# Patient Record
Sex: MALE | URBAN - METROPOLITAN AREA
[De-identification: names, ages, dates, MRNs, and addresses within clinical notes are randomized per-mention and may not be internally consistent; named-entity substitution may affect disease eponyms.]

---

## 2017-11-30 ENCOUNTER — OFFICE VISIT (OUTPATIENT)
Dept: FAMILY MEDICINE CLINIC | Age: 22
End: 2017-11-30

## 2017-11-30 VITALS
OXYGEN SATURATION: 95 % | RESPIRATION RATE: 14 BRPM | BODY MASS INDEX: 26.66 KG/M2 | HEIGHT: 69 IN | TEMPERATURE: 98.7 F | HEART RATE: 88 BPM | WEIGHT: 180 LBS | SYSTOLIC BLOOD PRESSURE: 136 MMHG | DIASTOLIC BLOOD PRESSURE: 74 MMHG

## 2017-11-30 DIAGNOSIS — J01.00 ACUTE NON-RECURRENT MAXILLARY SINUSITIS: ICD-10-CM

## 2017-11-30 DIAGNOSIS — J02.9 SORE THROAT: ICD-10-CM

## 2017-11-30 DIAGNOSIS — J02.0 STREP THROAT: Primary | ICD-10-CM

## 2017-11-30 DIAGNOSIS — M75.21 BICIPITAL TENDINITIS OF RIGHT SHOULDER: ICD-10-CM

## 2017-11-30 LAB
S PYO AG THROAT QL: POSITIVE
VALID INTERNAL CONTROL?: YES

## 2017-11-30 RX ORDER — HYDROCODONE POLISTIREX AND CHLORPHENIRAMINE POLISTIREX 10; 8 MG/5ML; MG/5ML
1 SUSPENSION, EXTENDED RELEASE ORAL
Qty: 90 ML | Refills: 0 | Status: SHIPPED | OUTPATIENT
Start: 2017-11-30 | End: 2018-05-03 | Stop reason: ALTCHOICE

## 2017-11-30 RX ORDER — AMOXICILLIN 875 MG/1
875 TABLET, FILM COATED ORAL 2 TIMES DAILY
Qty: 20 TAB | Refills: 0 | Status: SHIPPED | OUTPATIENT
Start: 2017-11-30 | End: 2017-12-10

## 2017-11-30 NOTE — LETTER
NOTIFICATION RETURN TO WORK / SCHOOL 
 
11/30/2017 3:14 PM 
 
Mr. Noa Han [address] [facility]. [facility] [phone] To Whom It May Concern: 
 
Noa Han is currently under the care of [facility]. He will return to work/school on: 12/04/17 If there are questions or concerns please have the patient contact our office. Sincerely, Omari Espitia MD

## 2017-11-30 NOTE — PATIENT INSTRUCTIONS
Take the antibiotic for 10 days and use the cough medication twice daily as needed. Rest your R shoulder for a few days, particularly doing curls.     Recheck as needed

## 2017-11-30 NOTE — PROGRESS NOTES
HISTORY OF PRESENT ILLNESS  Adelfo Bustillos is a 25 y.o. male. HPI Comments: Adelfo Bustillos is a 24 y/o male who presents today with a sore throat. He states that his symptoms began about a week ago. He has been experiencing a sore throat, cough, and headaches. He has tried throat lozenges but states that they do not help enough. He does not know if he has been around any sick contacts. He denies any fevers. He states that he has had R shoulder pain for a couple days. He states that it is painful to sleep on and to abduct above his head. He denies any trauma but does work out at [employer]. Sore Throat    Associated symptoms include cough. Pertinent negatives include no vomiting, no headaches and no shortness of breath. Cough   Pertinent negatives include no chest pain, no abdominal pain, no headaches and no shortness of breath. Review of Systems   Constitutional: Negative for chills and fever. HENT: Positive for sore throat. Eyes: Negative for blurred vision and double vision. Respiratory: Positive for cough. Negative for shortness of breath. Cardiovascular: Negative for chest pain and palpitations. Gastrointestinal: Negative for abdominal pain, nausea and vomiting. Musculoskeletal: Positive for joint pain (shoulder). Neurological: Negative for headaches. Physical Exam   Constitutional: He is oriented to person, place, and time. He appears well-developed and well-nourished. HENT:   Nose: Right sinus exhibits maxillary sinus tenderness. Left sinus exhibits maxillary sinus tenderness. Throat not red   Eyes: Pupils are equal, round, and reactive to light. Neck: Neck supple. No thyromegaly present. Cardiovascular: Normal rate and regular rhythm. Pulmonary/Chest: Effort normal and breath sounds normal. No respiratory distress. He has no wheezes. He has no rales. Abdominal: Soft. There is no tenderness.    Musculoskeletal:   Slightly tender anterior R shoulder Lymphadenopathy:     He has no cervical adenopathy. Neurological: He is alert and oriented to person, place, and time. Nursing note and vitals reviewed. ASSESSMENT and PLAN    ICD-10-CM ICD-9-CM    1. Strep throat J02.0 034.0 amoxicillin (AMOXIL) 875 mg tablet      chlorpheniramine-HYDROcodone (TUSSIONEX) 10-8 mg/5 mL suspension   2. Sore throat J02.9 462 AMB POC RAPID STREP A   3. Acute non-recurrent maxillary sinusitis J01.00 461.0 amoxicillin (AMOXIL) 875 mg tablet   4.  Bicipital tendinitis of right shoulder M75.21 726.12        AVS instructions reviewed with patient, pt verbalized understanding

## 2017-11-30 NOTE — MR AVS SNAPSHOT
Visit Information Date & Time Provider Department Dept. Phone Encounter #  
 11/30/2017  2:30 PM Gilles AjReji [facility] [phone] [id number] Follow-up Instructions Return if symptoms worsen or fail to improve. Upcoming Health Maintenance Date Due  
 HPV AGE 9Y-34Y (1 of 3 - Male 3 Dose Series) 9/7/2007 Influenza Age 5 to Adult 8/1/2017 DTaP/Tdap/Td series (1 - Tdap) 9/7/2017 Allergies as of 11/30/2017  Review Complete On: 11/30/2017 By: Gilles Aj MD  
 No Known Allergies Current Immunizations  Never Reviewed Name Date Influenza Vaccine 10/15/2017 Not reviewed this visit You Were Diagnosed With   
  
 Codes Comments Strep throat    -  Primary ICD-10-CM: J02.0 ICD-9-CM: 034.0 Sore throat     ICD-10-CM: J02.9 ICD-9-CM: 476 Acute non-recurrent maxillary sinusitis     ICD-10-CM: J01.00 ICD-9-CM: 461.0 Bicipital tendinitis of right shoulder     ICD-10-CM: M75.21 ICD-9-CM: 726.12 Vitals BP Pulse Temp Resp Height(growth percentile) Weight(growth percentile) 136/74 (BP 1 Location: Right arm, BP Patient Position: Sitting) 88 98.7 °F (37.1 °C) (Oral) 14 5' 9\" (1.753 m) 180 lb (81.6 kg) SpO2 BMI Smoking Status 95% 26.58 kg/m2 Never Smoker BMI and BSA Data Body Mass Index Body Surface Area  
 26.58 kg/m 2 1.99 m 2 Preferred Pharmacy Pharmacy Name Phone [id number], [address] [place] [address] Ricardo Janett [phone] Your Updated Medication List  
  
   
This list is accurate as of: 11/30/17  3:11 PM.  Always use your most recent med list.  
  
  
  
  
 amoxicillin 875 mg tablet Commonly known as:  AMOXIL Take 1 Tab by mouth two (2) times a day for 10 days. chlorpheniramine-HYDROcodone 10-8 mg/5 mL suspension Commonly known as:  Leonetta Krishnan Take 5 mL by mouth every twelve (12) hours as needed for Cough.  Max Daily Amount: 10 mL. Prescriptions Printed Refills  
 chlorpheniramine-HYDROcodone (TUSSIONEX) 10-8 mg/5 mL suspension 0 Sig: Take 5 mL by mouth every twelve (12) hours as needed for Cough. Max Daily Amount: 10 mL. Class: Print Route: Oral  
  
Prescriptions Sent to Pharmacy Refills  
 amoxicillin (AMOXIL) 875 mg tablet 0 Sig: Take 1 Tab by mouth two (2) times a day for 10 days. Class: Normal  
 Pharmacy: [address] [place] [address], [address] [place] [address] #: [phone] Route: Oral  
  
We Performed the Following AMB POC RAPID STREP A [30950 CPT(R)] Follow-up Instructions Return if symptoms worsen or fail to improve. Patient Instructions Take the antibiotic for 10 days and use the cough medication twice daily as needed. Rest your R shoulder for a few days, particularly doing curls. Recheck as needed Introducing [address]! [employer] introduces [employer] patient portal. Now you can access parts of your medical record, email your doctor's office, and request medication refills online. 1. In your internet browser, go to [email or web address] 2. Click on the First Time User? Click Here link in the Sign In box. You will see the New Member Sign Up page. 3. Enter your [employer] Access Code exactly as it appears below. You will not need to use this code after youve completed the sign-up process. If you do not sign up before the expiration date, you must request a new code. · [employer] Access Code: [email or web address] Expires: 2/28/2018  3:11 PM 
 
4. Enter the last four digits of your Social Security Number (xxxx) and Date of Birth (mm/dd/yyyy) as indicated and click Submit. You will be taken to the next sign-up page. 5. Create a [employer] ID. This will be your [employer] login ID and cannot be changed, so think of one that is secure and easy to remember. 6. Create a [employer] password. You can change your password at any time. 7. Enter your Password Reset Question and Answer. This can be used at a later time if you forget your password. 8. Enter your e-mail address. You will receive e-mail notification when new information is available in [address] 9. Click Sign Up. You can now view and download portions of your medical record. 10. Click the Download Summary menu link to download a portable copy of your medical information. If you have questions, please visit the Frequently Asked Questions section of the [employer] website. Remember, [employer] is NOT to be used for urgent needs. For medical emergencies, dial 911. Now available from your iPhone and Android! Please provide this summary of care documentation to your next provider. If you have any questions after today's visit, please call [phone].

## 2017-11-30 NOTE — PROGRESS NOTES
Patient presents to the clinic for a sore throat that began 1 week ago. Patient complains of a productive cough, nasal congestion. Patient would also like to discuss right shoulder pain. Flu Shot Requested: received    Depression Screening:  PHQ over the last two weeks 11/30/2017   Little interest or pleasure in doing things Not at all   Feeling down, depressed or hopeless Not at all   Total Score PHQ 2 0       Learning Assessment:  Learning Assessment 11/30/2017   PRIMARY LEARNER Patient   HIGHEST LEVEL OF EDUCATION - PRIMARY LEARNER  2 YEARS OF COLLEGE   BARRIERS PRIMARY LEARNER NONE   CO-LEARNER CAREGIVER No   PRIMARY LANGUAGE ENGLISH   LEARNER PREFERENCE PRIMARY DEMONSTRATION   ANSWERED BY patient   RELATIONSHIP SELF       Abuse Screening:  No flowsheet data found. Health Maintenance reviewed and discussed per provider: yes     Coordination of Care:    1. Have you been to the ER, urgent care clinic since your last visit? Hospitalized since your last visit? no    2. Have you seen or consulted any other health care providers outside of the [address] [place] [address] since your last visit? Include any pap smears or colon screening.  no

## 2018-05-03 ENCOUNTER — OFFICE VISIT (OUTPATIENT)
Dept: FAMILY MEDICINE CLINIC | Age: 23
End: 2018-05-03

## 2018-05-03 VITALS
TEMPERATURE: 98.4 F | OXYGEN SATURATION: 97 % | BODY MASS INDEX: 26.66 KG/M2 | HEART RATE: 101 BPM | RESPIRATION RATE: 18 BRPM | DIASTOLIC BLOOD PRESSURE: 76 MMHG | HEIGHT: 69 IN | WEIGHT: 180 LBS | SYSTOLIC BLOOD PRESSURE: 133 MMHG

## 2018-05-03 DIAGNOSIS — H66.93 BILATERAL OTITIS MEDIA, UNSPECIFIED OTITIS MEDIA TYPE: Primary | ICD-10-CM

## 2018-05-03 DIAGNOSIS — R11.2 NAUSEA AND VOMITING, INTRACTABILITY OF VOMITING NOT SPECIFIED, UNSPECIFIED VOMITING TYPE: ICD-10-CM

## 2018-05-03 DIAGNOSIS — K52.9 GASTROENTERITIS: ICD-10-CM

## 2018-05-03 RX ORDER — AMOXICILLIN 500 MG/1
500 CAPSULE ORAL 2 TIMES DAILY
Qty: 20 CAP | Refills: 0 | Status: SHIPPED | OUTPATIENT
Start: 2018-05-03 | End: 2018-05-13

## 2018-05-03 RX ORDER — ONDANSETRON 4 MG/1
4 TABLET, ORALLY DISINTEGRATING ORAL
Qty: 20 TAB | Refills: 0 | Status: SHIPPED | OUTPATIENT
Start: 2018-05-03

## 2018-05-03 NOTE — MR AVS SNAPSHOT
[address] [address] 
[phone] Patient: Meghann Chanel MRN: [id number] CYA:[birth year] Visit Information Date & Time Provider Department Dept. Phone Encounter #  
 5/3/2018  3:00 PM Bailee Iyer NP [email or web address] [phone] [id number] Follow-up Instructions Return if symptoms worsen or fail to improve, for panic attack. Upcoming Health Maintenance Date Due  
 HPV Age 9Y-34Y (3 of 1 - Male 3 Dose Series) 9/7/2007 DTaP/Tdap/Td series (1 - Tdap) 9/7/2017 Influenza Age 5 to Adult 8/1/2018 Allergies as of 5/3/2018  Review Complete On: 5/3/2018 By: Jairon Shah LPN No Known Allergies Current Immunizations  Never Reviewed Name Date Influenza Vaccine 10/15/2017 Not reviewed this visit You Were Diagnosed With   
  
 Codes Comments Bilateral otitis media, unspecified otitis media type    -  Primary ICD-10-CM: H66.93 
ICD-9-CM: 382.9 Gastroenteritis     ICD-10-CM: K52.9 ICD-9-CM: 558.9 Nausea and vomiting, intractability of vomiting not specified, unspecified vomiting type     ICD-10-CM: R11.2 ICD-9-CM: 787.01 Vitals BP Pulse Temp Resp Height(growth percentile) Weight(growth percentile) 133/76 (BP 1 Location: Right arm, BP Patient Position: Sitting) (!) 101 98.4 °F (36.9 °C) (Oral) 18 5' 9\" (1.753 m) 180 lb (81.6 kg) SpO2 BMI Smoking Status 97% 26.58 kg/m2 Never Smoker Vitals History BMI and BSA Data Body Mass Index Body Surface Area  
 26.58 kg/m 2 1.99 m 2 Preferred Pharmacy Pharmacy Name Phone [id number], [address] [place] [address] Igor Luong [phone] Your Updated Medication List  
  
   
This list is accurate as of 5/3/18  3:55 PM.  Always use your most recent med list.  
  
  
  
  
 amoxicillin 500 mg capsule Commonly known as:  AMOXIL Take 1 Cap by mouth two (2) times a day for 10 days. chlorpheniramine-HYDROcodone 10-8 mg/5 mL suspension Commonly known as:  Nicholas Galindo Take 5 mL by mouth every twelve (12) hours as needed for Cough. Max Daily Amount: 10 mL. ondansetron 4 mg disintegrating tablet Commonly known as:  ZOFRAN ODT Take 1 Tab by mouth every eight (8) hours as needed for Nausea (1-2 tab). Prescriptions Sent to Pharmacy Refills  
 amoxicillin (AMOXIL) 500 mg capsule 0 Sig: Take 1 Cap by mouth two (2) times a day for 10 days. Class: Normal  
 Pharmacy: [address] [place] [address] #: [phone] Route: Oral  
 ondansetron (ZOFRAN ODT) 4 mg disintegrating tablet 0 Sig: Take 1 Tab by mouth every eight (8) hours as needed for Nausea (1-2 tab). Class: Normal  
 Pharmacy: [address] [place] [address] #: [phone] Route: Oral  
  
Follow-up Instructions Return if symptoms worsen or fail to improve, for panic attack. Patient Instructions Gastroenteritis: Care Instructions Your Care Instructions Gastroenteritis is an illness that may cause nausea, vomiting, and diarrhea. It is sometimes called \"stomach flu. \" It can be caused by bacteria or a virus. You will probably begin to feel better in 1 to 2 days. In the meantime, get plenty of rest and make sure you do not become dehydrated. Dehydration occurs when your body loses too much fluid. Follow-up care is a key part of your treatment and safety. Be sure to make and go to all appointments, and call your doctor if you are having problems. It's also a good idea to know your test results and keep a list of the medicines you take. How can you care for yourself at home? · If your doctor prescribed antibiotics, take them as directed. Do not stop taking them just because you feel better. You need to take the full course of antibiotics. · Drink plenty of fluids to prevent dehydration, enough so that your urine is light yellow or clear like water. Choose water and other caffeine-free clear liquids until you feel better. If you have kidney, heart, or liver disease and have to limit fluids, talk with your doctor before you increase your fluid intake. · Drink fluids slowly, in frequent, small amounts, because drinking too much too fast can cause vomiting. · Begin eating mild foods, such as dry toast, yogurt, applesauce, bananas, and rice. Avoid spicy, hot, or high-fat foods, and do not drink alcohol or caffeine for a day or two. Do not drink milk or eat ice cream until you are feeling better. How to prevent gastroenteritis · Keep hot foods hot and cold foods cold. · Do not eat meats, dressings, salads, or other foods that have been kept at room temperature for more than 2 hours. · Use a thermometer to check your refrigerator. It should be between 34°F and 40°F. 
· Defrost meats in the refrigerator or microwave, not on the kitchen counter. · Keep your hands and your kitchen clean. Wash your hands, cutting boards, and countertops with hot soapy water frequently. · Cook meat until it is well done. · Do not eat raw eggs or uncooked sauces made with raw eggs. · Do not take chances. If food looks or tastes spoiled, throw it out. When should you call for help? Call 911 anytime you think you may need emergency care. For example, call if: 
? · You vomit blood or what looks like coffee grounds. ? · You passed out (lost consciousness). ? · You pass maroon or very bloody stools. ?Call your doctor now or seek immediate medical care if: 
? · You have severe belly pain. ? · You have signs of needing more fluids. You have sunken eyes, a dry mouth, and pass only a little dark urine. ? · You feel like you are going to faint. ? · You have increased belly pain that does not go away in 1 to 2 days. ? · You have new or increased nausea, or you are vomiting. ? · You have a new or higher fever. ? · Your stools are black and tarlike or have streaks of blood. ? Watch closely for changes in your health, and be sure to contact your doctor if: 
? · You are dizzy or lightheaded. ? · You urinate less than usual, or your urine is dark yellow or brown. ? · You do not feel better with each day that goes by. Where can you learn more? Go to http://otis-olayinka.info/. Enter N142 in the search box to learn more about \"Gastroenteritis: Care Instructions. \" Current as of: March 3, 2017 Content Version: 11.4 © 8938-2592 [employer]. Care instructions adapted under license by [employer] (which disclaims liability or warranty for this information). If you have questions about a medical condition or this instruction, always ask your healthcare professional. [place] [address] any warranty or liability for your use of this information. Ear Infection (Otitis Media): Care Instructions Your Care Instructions An ear infection may start with a cold and affect the middle ear (otitis media). It can hurt a lot. Most ear infections clear up on their own in a couple of days. Most often you will not need antibiotics. This is because many ear infections are caused by a virus. Antibiotics don't work against a virus. Regular doses of pain medicines are the best way to reduce your fever and help you feel better. Follow-up care is a key part of your treatment and safety. Be sure to make and go to all appointments, and call your doctor if you are having problems. It's also a good idea to know your test results and keep a list of the medicines you take. How can you care for yourself at home? · Take pain medicines exactly as directed. ¨ If the doctor gave you a prescription medicine for pain, take it as prescribed. ¨ If you are not taking a prescription pain medicine, take an over-the-counter medicine, such as acetaminophen (Tylenol), ibuprofen (Advil, Motrin), or naproxen (Aleve). Read and follow all instructions on the label. ¨ Do not take two or more pain medicines at the same time unless the doctor told you to. Many pain medicines have acetaminophen, which is Tylenol. Too much acetaminophen (Tylenol) can be harmful. · Plan to take a full dose of pain reliever before bedtime. Getting enough sleep will help you get better. · Try a warm, moist washcloth on the ear. It may help relieve pain. · If your doctor prescribed antibiotics, take them as directed. Do not stop taking them just because you feel better. You need to take the full course of antibiotics. When should you call for help? Call your doctor now or seek immediate medical care if: 
? · You have new or increasing ear pain. ? · You have new or increasing pus or blood draining from your ear. ? · You have a fever with a stiff neck or a severe headache. ? Watch closely for changes in your health, and be sure to contact your doctor if: 
? · You have new or worse symptoms. ? · You are not getting better after taking an antibiotic for 2 days. Where can you learn more? Go to http://otis-olayinka.info/. Enter F747 in the search box to learn more about \"Ear Infection (Otitis Media): Care Instructions. \" Current as of: May 12, 2017 Content Version: 11.4 © 5105-6612 Local[employer]. Care instructions adapted under license by [employer] (which disclaims liability or warranty for this information). If you have questions about a medical condition or this instruction, always ask your healthcare professional. [place] [address] any warranty or liability for your use of this information. Introducing [facility]!    
 [employer] introduces [employer] patient portal. Now you can access parts of your medical record, email your doctor's office, and request medication refills online. 1. In your internet browser, go to [email or web address] 2. Click on the First Time User? Click Here link in the Sign In box. You will see the New Member Sign Up page. 3. Enter your [employer] Access Code exactly as it appears below. You will not need to use this code after youve completed the sign-up process. If you do not sign up before the expiration date, you must request a new code. · [employer] Access Code: [email or web address] Expires: 8/1/2018  3:55 PM 
 
4. Enter the last four digits of your Social Security Number (xxxx) and Date of Birth (mm/dd/yyyy) as indicated and click Submit. You will be taken to the next sign-up page. 5. Create a [employer] Look [employer] ID. This will be your [employer] login ID and cannot be changed, so think of one that is secure and easy to remember. 6. Create a [employer] password. You can change your password at any time. 7. Enter your Password Reset Question and Answer. This can be used at a later time if you forget your password. 8. Enter your e-mail address. You will receive e-mail notification when new information is available in [address] 9. Click Sign Up. You can now view and download portions of your medical record. 10. Click the Download Summary menu link to download a portable copy of your medical information. If you have questions, please visit the Frequently Asked Questions section of the [employer] website. Remember, [employer] is NOT to be used for urgent needs. For medical emergencies, dial 911. Now available from your iPhone and Android! Please provide this summary of care documentation to your next provider. If you have any questions after today's visit, please call [phone].

## 2018-05-03 NOTE — PROGRESS NOTES
HISTORY OF PRESENT ILLNESS  Cookie Dias is a 25 y.o. male. HPI Comments: Patient presents today c/o N/V/D, abdominal pain, and associated URI onset 3 days ago. H/o allergic rhinitis pt has been taking zyrtec/claritin with improvement of symptoms but has had several episodes vomiting and diarrhea, last episode vomiting (clear) prior to visit and diarrhea this AM, watery and blood streaked. Pt ate out prior to symptom onset but denies if food uncooked/suspicious. Pt reports roommates girlfriend ill with ear infection. History of strep in 11/2017- pt denies current s/s similar to episode. Pt also reports panic attack, new issue, onset last week and then this past Tuesday, duration 10min, associated SOB and sweating, relieved with rest and walking. Pt unsure if r/t current symptoms. Cold Symptoms   The history is provided by the patient. This is a new problem. The current episode started more than 2 days ago. The problem has been gradually improving. Patient reports a subjective fever - was not measured. Associated symptoms include chills, sweats, ear congestion, rhinorrhea, sore throat, nausea and vomiting. Pertinent negatives include no chest pain, no ear pain, no headaches, no myalgias, no shortness of breath and no wheezing. Treatments tried: zyrtec/claritin. The treatment provided mild relief. His past medical history does not include asthma. Nausea    The history is provided by the patient. This is a new problem. The current episode started more than 2 days ago (x3 days). The problem occurs 2 to 4 times per day. The problem has not changed since onset. The emesis has an appearance of clear. Patient reports a subjective fever - was not measured. Associated symptoms include chills, a fever, sweats, abdominal pain, diarrhea and URI. Pertinent negatives include no headaches, no myalgias, no cough and no headaches. Risk factors include ill contacts.    Diarrhea    The history is provided by the patient. This is a new problem. The current episode started more than 2 days ago (x3 days). The problem occurs 2 to 4 times per day. The problem has not changed since onset. Patient reports a subjective fever - was not measured. The stool consistency is described as watery and blood-tinged. Associated symptoms include abdominal pain, vomiting, chills, sweats and URI. Pertinent negatives include no headaches, no myalgias and no cough. Risk factors include ill contacts and suspect food intake. He has tried a change of diet for the symptoms. Panic Attack   The history is provided by the patient. This is a new problem. The current episode started more than 1 week ago. Episode frequency: 2 episodes. Associated symptoms include abdominal pain. Pertinent negatives include no chest pain, no headaches and no shortness of breath. The symptoms are aggravated by stress. The symptoms are relieved by walking and rest.       Review of Systems   Constitutional: Positive for chills, diaphoresis and fever. HENT: Positive for rhinorrhea and sore throat. Negative for congestion, ear pain and sinus pain. Respiratory: Negative for cough, shortness of breath and wheezing. Cardiovascular: Negative for chest pain. Gastrointestinal: Positive for abdominal pain, blood in stool, diarrhea, nausea and vomiting. Musculoskeletal: Negative for myalgias. Neurological: Negative for headaches. Endo/Heme/Allergies: Positive for environmental allergies. Psychiatric/Behavioral: The patient is nervous/anxious. Physical Exam   Constitutional: He is oriented to person, place, and time. HENT:   Right Ear: Tympanic membrane is injected and erythematous. Left Ear: Tympanic membrane is injected and erythematous. Nose: Nose normal. Right sinus exhibits no maxillary sinus tenderness and no frontal sinus tenderness. Left sinus exhibits no maxillary sinus tenderness and no frontal sinus tenderness.    Mouth/Throat: Oropharynx is clear and moist.   Cardiovascular: Regular rhythm and normal heart sounds. Tachycardia present. Pulmonary/Chest: Effort normal and breath sounds normal.   Abdominal: Soft. Bowel sounds are normal. There is no tenderness. Lymphadenopathy:     He has cervical adenopathy. Neurological: He is alert and oriented to person, place, and time. Psychiatric: He has a normal mood and affect. His mood appears not anxious. Visit Vitals    /76 (BP 1 Location: Right arm, BP Patient Position: Sitting)    Pulse (!) 101    Temp 98.4 °F (36.9 °C) (Oral)    Resp 18    Ht 5' 9\" (1.753 m)    Wt 180 lb (81.6 kg)    SpO2 97%    BMI 26.58 kg/m2      ASSESSMENT and PLAN    ICD-10-CM ICD-9-CM    1. Bilateral otitis media, unspecified otitis media type H66.93 382.9 amoxicillin (AMOXIL) 500 mg capsule   2. Gastroenteritis K52.9 558.9 ondansetron (ZOFRAN ODT) 4 mg disintegrating tablet   3. Nausea and vomiting, intractability of vomiting not specified, unspecified vomiting type R11.2 787.01 ondansetron (ZOFRAN ODT) 4 mg disintegrating tablet     Reviewed plan with patient including diagnoses, treatment and follow up. Provided AVS with education on above diagnoses. Advise on rest, fluids, medication indication, use and s/e. No further questions/concerns at this time. Pt to follow up if symptoms worsen and/or panic attack recurrence.

## 2018-05-03 NOTE — LETTER
NOTIFICATION RETURN TO WORK / SCHOOL 
 
5/3/2018 3:54 PM 
 
Mr. Tr Coreas [address] [facility] [phone] To Whom It May Concern: 
 
Tr Coreas is currently under the care of [facility]. He will return to work/school on: Monday, 5/7/2018. If there are questions or concerns please have the patient contact our office. Sincerely, Danny Mendoza NP

## 2018-05-03 NOTE — PATIENT INSTRUCTIONS
Gastroenteritis: Care Instructions  Your Care Instructions    Gastroenteritis is an illness that may cause nausea, vomiting, and diarrhea. It is sometimes called \"stomach flu. \" It can be caused by bacteria or a virus. You will probably begin to feel better in 1 to 2 days. In the meantime, get plenty of rest and make sure you do not become dehydrated. Dehydration occurs when your body loses too much fluid. Follow-up care is a key part of your treatment and safety. Be sure to make and go to all appointments, and call your doctor if you are having problems. It's also a good idea to know your test results and keep a list of the medicines you take. How can you care for yourself at home? · If your doctor prescribed antibiotics, take them as directed. Do not stop taking them just because you feel better. You need to take the full course of antibiotics. · Drink plenty of fluids to prevent dehydration, enough so that your urine is light yellow or clear like water. Choose water and other caffeine-free clear liquids until you feel better. If you have kidney, heart, or liver disease and have to limit fluids, talk with your doctor before you increase your fluid intake. · Drink fluids slowly, in frequent, small amounts, because drinking too much too fast can cause vomiting. · Begin eating mild foods, such as dry toast, yogurt, applesauce, bananas, and rice. Avoid spicy, hot, or high-fat foods, and do not drink alcohol or caffeine for a day or two. Do not drink milk or eat ice cream until you are feeling better. How to prevent gastroenteritis  · Keep hot foods hot and cold foods cold. · Do not eat meats, dressings, salads, or other foods that have been kept at room temperature for more than 2 hours. · Use a thermometer to check your refrigerator. It should be between 34°F and 40°F.  · Defrost meats in the refrigerator or microwave, not on the kitchen counter. · Keep your hands and your kitchen clean.  Wash your hands, cutting boards, and countertops with hot soapy water frequently. · Cook meat until it is well done. · Do not eat raw eggs or uncooked sauces made with raw eggs. · Do not take chances. If food looks or tastes spoiled, throw it out. When should you call for help? Call 911 anytime you think you may need emergency care. For example, call if:  ? · You vomit blood or what looks like coffee grounds. ? · You passed out (lost consciousness). ? · You pass maroon or very bloody stools. ?Call your doctor now or seek immediate medical care if:  ? · You have severe belly pain. ? · You have signs of needing more fluids. You have sunken eyes, a dry mouth, and pass only a little dark urine. ? · You feel like you are going to faint. ? · You have increased belly pain that does not go away in 1 to 2 days. ? · You have new or increased nausea, or you are vomiting. ? · You have a new or higher fever. ? · Your stools are black and tarlike or have streaks of blood. ? Watch closely for changes in your health, and be sure to contact your doctor if:  ? · You are dizzy or lightheaded. ? · You urinate less than usual, or your urine is dark yellow or brown. ? · You do not feel better with each day that goes by. Where can you learn more? Go to http://otis-olayinka.info/. Enter N142 in the search box to learn more about \"Gastroenteritis: Care Instructions. \"  Current as of: March 3, 2017  Content Version: 11.4  © 3316-1385 [email or web address]. Care instructions adapted under license by [employer] (which disclaims liability or warranty for this information). If you have questions about a medical condition or this instruction, always ask your healthcare professional. [address] any warranty or liability for your use of this information.        Ear Infection (Otitis Media): Care Instructions  Your Care Instructions    An ear infection may start with a cold and affect the middle ear (otitis media). It can hurt a lot. Most ear infections clear up on their own in a couple of days. Most often you will not need antibiotics. This is because many ear infections are caused by a virus. Antibiotics don't work against a virus. Regular doses of pain medicines are the best way to reduce your fever and help you feel better. Follow-up care is a key part of your treatment and safety. Be sure to make and go to all appointments, and call your doctor if you are having problems. It's also a good idea to know your test results and keep a list of the medicines you take. How can you care for yourself at home? · Take pain medicines exactly as directed. ¨ If the doctor gave you a prescription medicine for pain, take it as prescribed. ¨ If you are not taking a prescription pain medicine, take an over-the-counter medicine, such as acetaminophen (Tylenol), ibuprofen (Advil, Motrin), or naproxen (Aleve). Read and follow all instructions on the label. ¨ Do not take two or more pain medicines at the same time unless the doctor told you to. Many pain medicines have acetaminophen, which is Tylenol. Too much acetaminophen (Tylenol) can be harmful. · Plan to take a full dose of pain reliever before bedtime. Getting enough sleep will help you get better. · Try a warm, moist washcloth on the ear. It may help relieve pain. · If your doctor prescribed antibiotics, take them as directed. Do not stop taking them just because you feel better. You need to take the full course of antibiotics. When should you call for help? Call your doctor now or seek immediate medical care if:  ? · You have new or increasing ear pain. ? · You have new or increasing pus or blood draining from your ear. ? · You have a fever with a stiff neck or a severe headache. ? Watch closely for changes in your health, and be sure to contact your doctor if:  ? · You have new or worse symptoms.    ? · You are not getting better after taking an antibiotic for 2 days. Where can you learn more? Go to http://otis-olayinka.info/. Enter C321 in the search box to learn more about \"Ear Infection (Otitis Media): Care Instructions. \"  Current as of: May 12, 2017  Content Version: 11.4  © 9902-0224 [employer]. Care instructions adapted under license by [employer] (which disclaims liability or warranty for this information). If you have questions about a medical condition or this instruction, always ask your healthcare professional. [address] any warranty or liability for your use of this information.

## 2018-05-03 NOTE — PROGRESS NOTES
Rm:1    Chief Complaint   Patient presents with    Cold Symptoms     vomiting, diarrhea, stuufy nose     Depression Screening:  PHQ over the last two weeks 5/3/2018 11/30/2017   Little interest or pleasure in doing things Not at all Not at all   Feeling down, depressed or hopeless Not at all Not at all   Total Score PHQ 2 0 0       Learning Assessment:  Learning Assessment 11/30/2017   PRIMARY LEARNER Patient   HIGHEST LEVEL OF EDUCATION - PRIMARY LEARNER  2 YEARS [facility] PRIMARY LEARNER NONE   CO-LEARNER CAREGIVER No   PRIMARY LANGUAGE ENGLISH   LEARNER PREFERENCE PRIMARY DEMONSTRATION   ANSWERED BY patient   RELATIONSHIP SELF       Abuse Screening:  No flowsheet data found. Health Maintenance reviewed and discussed per provider: yes     Coordination of Care:    1. Have you been to the ER, urgent care clinic since your last visit? Hospitalized since your last visit? no    2. Have you seen or consulted any other health care providers outside of the [address] [place] [address] since your last visit? Include any pap smears or colon screening.  no

## 2018-05-03 NOTE — LETTER
NOTIFICATION RETURN TO WORK / SCHOOL 
 
5/3/2018 4:03 PM 
 
Mr. Anish Kearns [address] [facility] [facility] [phone] To Whom It May Concern: 
 
Anish Kearns is currently under the care of [facility]. Please excuse him from work from 5/3/2018 to 5/6/2018. He will return on 5/7/2018. If there are questions or concerns please have the patient contact our office. Sincerely, Mónica Patel NP